# Patient Record
Sex: FEMALE | Race: WHITE | NOT HISPANIC OR LATINO | Employment: OTHER | URBAN - METROPOLITAN AREA
[De-identification: names, ages, dates, MRNs, and addresses within clinical notes are randomized per-mention and may not be internally consistent; named-entity substitution may affect disease eponyms.]

---

## 2018-09-04 ENCOUNTER — APPOINTMENT (OUTPATIENT)
Dept: RADIOLOGY | Facility: MEDICAL CENTER | Age: 68
DRG: 054 | End: 2018-09-04
Attending: EMERGENCY MEDICINE
Payer: MEDICARE

## 2018-09-04 ENCOUNTER — HOSPITAL ENCOUNTER (OUTPATIENT)
Dept: RADIOLOGY | Facility: MEDICAL CENTER | Age: 68
End: 2018-09-04

## 2018-09-04 ENCOUNTER — APPOINTMENT (OUTPATIENT)
Dept: RADIOLOGY | Facility: MEDICAL CENTER | Age: 68
DRG: 054 | End: 2018-09-04
Attending: HOSPITALIST
Payer: MEDICARE

## 2018-09-04 ENCOUNTER — HOSPITAL ENCOUNTER (INPATIENT)
Facility: MEDICAL CENTER | Age: 68
LOS: 2 days | DRG: 054 | End: 2018-09-06
Attending: EMERGENCY MEDICINE | Admitting: HOSPITALIST
Payer: MEDICARE

## 2018-09-04 DIAGNOSIS — I62.9 ICB (INTRACRANIAL BLEED) (HCC): ICD-10-CM

## 2018-09-04 DIAGNOSIS — C79.31 METASTASIS TO BRAIN (HCC): ICD-10-CM

## 2018-09-04 DIAGNOSIS — R41.82 ALTERED MENTAL STATUS, UNSPECIFIED ALTERED MENTAL STATUS TYPE: ICD-10-CM

## 2018-09-04 DIAGNOSIS — I61.9 NONTRAUMATIC INTRACEREBRAL HEMORRHAGE, UNSPECIFIED CEREBRAL LOCATION, UNSPECIFIED LATERALITY (HCC): ICD-10-CM

## 2018-09-04 PROBLEM — G93.40 ENCEPHALOPATHY: Status: ACTIVE | Noted: 2018-09-04

## 2018-09-04 PROBLEM — C43.9 MELANOMA (HCC): Status: ACTIVE | Noted: 2018-09-04

## 2018-09-04 PROBLEM — D23.9 DYSPLASTIC NEVUS: Status: ACTIVE | Noted: 2018-09-04

## 2018-09-04 PROBLEM — R91.8 LUNG MASS: Status: ACTIVE | Noted: 2018-09-04

## 2018-09-04 PROBLEM — J18.9 PNEUMONIA: Status: ACTIVE | Noted: 2018-09-04

## 2018-09-04 PROBLEM — D72.829 LEUKOCYTOSIS: Status: ACTIVE | Noted: 2018-09-04

## 2018-09-04 LAB
ALBUMIN SERPL BCP-MCNC: 4.4 G/DL (ref 3.2–4.9)
ALBUMIN/GLOB SERPL: 1.4 G/DL
ALP SERPL-CCNC: 91 U/L (ref 30–99)
ALT SERPL-CCNC: 17 U/L (ref 2–50)
ANION GAP SERPL CALC-SCNC: 11 MMOL/L (ref 0–11.9)
APTT PPP: 28 SEC (ref 24.7–36)
AST SERPL-CCNC: 21 U/L (ref 12–45)
BASOPHILS # BLD AUTO: 0.2 % (ref 0–1.8)
BASOPHILS # BLD: 0.04 K/UL (ref 0–0.12)
BILIRUB SERPL-MCNC: 0.5 MG/DL (ref 0.1–1.5)
BNP SERPL-MCNC: 140 PG/ML (ref 0–100)
BUN SERPL-MCNC: 17 MG/DL (ref 8–22)
CALCIUM SERPL-MCNC: 9.5 MG/DL (ref 8.5–10.5)
CFT BLD TEG: 3.8 MIN (ref 5–10)
CHLORIDE SERPL-SCNC: 106 MMOL/L (ref 96–112)
CLOT ANGLE BLD TEG: 73.6 DEGREES (ref 53–72)
CLOT LYSIS 30M P MA LENFR BLD TEG: 0 % (ref 0–8)
CO2 SERPL-SCNC: 21 MMOL/L (ref 20–33)
CREAT SERPL-MCNC: 0.64 MG/DL (ref 0.5–1.4)
CT.EXTRINSIC BLD ROTEM: 1 MIN (ref 1–3)
EKG IMPRESSION: NORMAL
EOSINOPHIL # BLD AUTO: 0.01 K/UL (ref 0–0.51)
EOSINOPHIL NFR BLD: 0.1 % (ref 0–6.9)
ERYTHROCYTE [DISTWIDTH] IN BLOOD BY AUTOMATED COUNT: 42.4 FL (ref 35.9–50)
GLOBULIN SER CALC-MCNC: 3.2 G/DL (ref 1.9–3.5)
GLUCOSE SERPL-MCNC: 166 MG/DL (ref 65–99)
HCT VFR BLD AUTO: 38.1 % (ref 37–47)
HGB BLD-MCNC: 12.6 G/DL (ref 12–16)
IMM GRANULOCYTES # BLD AUTO: 0.07 K/UL (ref 0–0.11)
IMM GRANULOCYTES NFR BLD AUTO: 0.4 % (ref 0–0.9)
INR PPP: 1.02 (ref 0.87–1.13)
LIPASE SERPL-CCNC: <3 U/L (ref 11–82)
LYMPHOCYTES # BLD AUTO: 0.53 K/UL (ref 1–4.8)
LYMPHOCYTES NFR BLD: 3.2 % (ref 22–41)
MCF BLD TEG: 71.2 MM (ref 50–70)
MCH RBC QN AUTO: 30.4 PG (ref 27–33)
MCHC RBC AUTO-ENTMCNC: 33.1 G/DL (ref 33.6–35)
MCV RBC AUTO: 92 FL (ref 81.4–97.8)
MONOCYTES # BLD AUTO: 0.51 K/UL (ref 0–0.85)
MONOCYTES NFR BLD AUTO: 3.1 % (ref 0–13.4)
NEUTROPHILS # BLD AUTO: 15.55 K/UL (ref 2–7.15)
NEUTROPHILS NFR BLD: 93 % (ref 44–72)
NRBC # BLD AUTO: 0 K/UL
NRBC BLD-RTO: 0 /100 WBC
PA AA BLD-ACNC: 17.5 %
PA ADP BLD-ACNC: 3.9 %
PLATELET # BLD AUTO: 308 K/UL (ref 164–446)
PMV BLD AUTO: 11.4 FL (ref 9–12.9)
POTASSIUM SERPL-SCNC: 3.6 MMOL/L (ref 3.6–5.5)
PROT SERPL-MCNC: 7.6 G/DL (ref 6–8.2)
PROTHROMBIN TIME: 13.1 SEC (ref 12–14.6)
RBC # BLD AUTO: 4.14 M/UL (ref 4.2–5.4)
SODIUM SERPL-SCNC: 138 MMOL/L (ref 135–145)
TEG ALGORITHM TGALG: ABNORMAL
TROPONIN I SERPL-MCNC: <0.01 NG/ML (ref 0–0.04)
WBC # BLD AUTO: 16.7 K/UL (ref 4.8–10.8)

## 2018-09-04 PROCEDURE — 700105 HCHG RX REV CODE 258: Performed by: INTERNAL MEDICINE

## 2018-09-04 PROCEDURE — 84484 ASSAY OF TROPONIN QUANT: CPT

## 2018-09-04 PROCEDURE — 83880 ASSAY OF NATRIURETIC PEPTIDE: CPT

## 2018-09-04 PROCEDURE — 85576 BLOOD PLATELET AGGREGATION: CPT

## 2018-09-04 PROCEDURE — 36415 COLL VENOUS BLD VENIPUNCTURE: CPT

## 2018-09-04 PROCEDURE — 85347 COAGULATION TIME ACTIVATED: CPT

## 2018-09-04 PROCEDURE — 99223 1ST HOSP IP/OBS HIGH 75: CPT | Mod: AI | Performed by: HOSPITALIST

## 2018-09-04 PROCEDURE — 93005 ELECTROCARDIOGRAM TRACING: CPT | Performed by: INTERNAL MEDICINE

## 2018-09-04 PROCEDURE — 93005 ELECTROCARDIOGRAM TRACING: CPT | Performed by: EMERGENCY MEDICINE

## 2018-09-04 PROCEDURE — 700105 HCHG RX REV CODE 258: Performed by: HOSPITALIST

## 2018-09-04 PROCEDURE — 99291 CRITICAL CARE FIRST HOUR: CPT

## 2018-09-04 PROCEDURE — 93010 ELECTROCARDIOGRAM REPORT: CPT | Performed by: INTERNAL MEDICINE

## 2018-09-04 PROCEDURE — 70553 MRI BRAIN STEM W/O & W/DYE: CPT

## 2018-09-04 PROCEDURE — 80053 COMPREHEN METABOLIC PANEL: CPT

## 2018-09-04 PROCEDURE — 770022 HCHG ROOM/CARE - ICU (200)

## 2018-09-04 PROCEDURE — 93005 ELECTROCARDIOGRAM TRACING: CPT

## 2018-09-04 PROCEDURE — 700105 HCHG RX REV CODE 258: Performed by: NEUROLOGICAL SURGERY

## 2018-09-04 PROCEDURE — 83690 ASSAY OF LIPASE: CPT

## 2018-09-04 PROCEDURE — 700111 HCHG RX REV CODE 636 W/ 250 OVERRIDE (IP): Performed by: HOSPITALIST

## 2018-09-04 PROCEDURE — 99221 1ST HOSP IP/OBS SF/LOW 40: CPT | Performed by: INTERNAL MEDICINE

## 2018-09-04 PROCEDURE — 700111 HCHG RX REV CODE 636 W/ 250 OVERRIDE (IP): Performed by: INTERNAL MEDICINE

## 2018-09-04 PROCEDURE — 85384 FIBRINOGEN ACTIVITY: CPT

## 2018-09-04 PROCEDURE — 700111 HCHG RX REV CODE 636 W/ 250 OVERRIDE (IP): Performed by: EMERGENCY MEDICINE

## 2018-09-04 PROCEDURE — 85025 COMPLETE CBC W/AUTO DIFF WBC: CPT

## 2018-09-04 PROCEDURE — 85730 THROMBOPLASTIN TIME PARTIAL: CPT

## 2018-09-04 PROCEDURE — 96374 THER/PROPH/DIAG INJ IV PUSH: CPT

## 2018-09-04 PROCEDURE — 700111 HCHG RX REV CODE 636 W/ 250 OVERRIDE (IP): Performed by: NEUROLOGICAL SURGERY

## 2018-09-04 PROCEDURE — 85610 PROTHROMBIN TIME: CPT

## 2018-09-04 RX ORDER — ONDANSETRON 2 MG/ML
4 INJECTION INTRAMUSCULAR; INTRAVENOUS EVERY 4 HOURS PRN
Status: DISCONTINUED | OUTPATIENT
Start: 2018-09-04 | End: 2018-09-05

## 2018-09-04 RX ORDER — ONDANSETRON 4 MG/1
4 TABLET, ORALLY DISINTEGRATING ORAL EVERY 4 HOURS PRN
Status: DISCONTINUED | OUTPATIENT
Start: 2018-09-04 | End: 2018-09-05

## 2018-09-04 RX ORDER — LABETALOL HYDROCHLORIDE 5 MG/ML
10 INJECTION, SOLUTION INTRAVENOUS
Status: DISCONTINUED | OUTPATIENT
Start: 2018-09-04 | End: 2018-09-05

## 2018-09-04 RX ORDER — BISACODYL 10 MG
10 SUPPOSITORY, RECTAL RECTAL
Status: DISCONTINUED | OUTPATIENT
Start: 2018-09-04 | End: 2018-09-06 | Stop reason: HOSPADM

## 2018-09-04 RX ORDER — SODIUM CHLORIDE, SODIUM LACTATE, POTASSIUM CHLORIDE, CALCIUM CHLORIDE 600; 310; 30; 20 MG/100ML; MG/100ML; MG/100ML; MG/100ML
INJECTION, SOLUTION INTRAVENOUS CONTINUOUS
Status: DISCONTINUED | OUTPATIENT
Start: 2018-09-04 | End: 2018-09-04

## 2018-09-04 RX ORDER — POLYETHYLENE GLYCOL 3350 17 G/17G
1 POWDER, FOR SOLUTION ORAL
Status: DISCONTINUED | OUTPATIENT
Start: 2018-09-04 | End: 2018-09-06 | Stop reason: HOSPADM

## 2018-09-04 RX ORDER — HYDRALAZINE HYDROCHLORIDE 20 MG/ML
10 INJECTION INTRAMUSCULAR; INTRAVENOUS
Status: DISCONTINUED | OUTPATIENT
Start: 2018-09-04 | End: 2018-09-05

## 2018-09-04 RX ORDER — ACETAMINOPHEN 325 MG/1
650 TABLET ORAL EVERY 6 HOURS PRN
Status: DISCONTINUED | OUTPATIENT
Start: 2018-09-04 | End: 2018-09-06 | Stop reason: HOSPADM

## 2018-09-04 RX ORDER — SODIUM CHLORIDE 9 MG/ML
INJECTION, SOLUTION INTRAVENOUS CONTINUOUS
Status: DISCONTINUED | OUTPATIENT
Start: 2018-09-04 | End: 2018-09-05

## 2018-09-04 RX ORDER — AMOXICILLIN 250 MG
2 CAPSULE ORAL 2 TIMES DAILY
Status: DISCONTINUED | OUTPATIENT
Start: 2018-09-04 | End: 2018-09-06 | Stop reason: HOSPADM

## 2018-09-04 RX ORDER — DEXAMETHASONE SODIUM PHOSPHATE 4 MG/ML
10 INJECTION, SOLUTION INTRA-ARTICULAR; INTRALESIONAL; INTRAMUSCULAR; INTRAVENOUS; SOFT TISSUE ONCE
Status: COMPLETED | OUTPATIENT
Start: 2018-09-04 | End: 2018-09-04

## 2018-09-04 RX ADMIN — DEXAMETHASONE SODIUM PHOSPHATE 10 MG: 4 INJECTION, SOLUTION INTRAMUSCULAR; INTRAVENOUS at 14:33

## 2018-09-04 RX ADMIN — FENTANYL CITRATE 25 MCG: 50 INJECTION INTRAMUSCULAR; INTRAVENOUS at 18:47

## 2018-09-04 RX ADMIN — FENTANYL CITRATE 25 MCG: 50 INJECTION INTRAMUSCULAR; INTRAVENOUS at 20:49

## 2018-09-04 RX ADMIN — SODIUM CHLORIDE: 9 INJECTION, SOLUTION INTRAVENOUS at 18:15

## 2018-09-04 RX ADMIN — SODIUM CHLORIDE, POTASSIUM CHLORIDE, SODIUM LACTATE AND CALCIUM CHLORIDE: 600; 310; 30; 20 INJECTION, SOLUTION INTRAVENOUS at 18:01

## 2018-09-04 RX ADMIN — ONDANSETRON 4 MG: 2 INJECTION INTRAMUSCULAR; INTRAVENOUS at 21:00

## 2018-09-04 RX ADMIN — SODIUM CHLORIDE 500 MG: 9 INJECTION, SOLUTION INTRAVENOUS at 18:01

## 2018-09-04 RX ADMIN — FENTANYL CITRATE 25 MCG: 50 INJECTION INTRAMUSCULAR; INTRAVENOUS at 23:27

## 2018-09-04 ASSESSMENT — PAIN SCALES - GENERAL
PAINLEVEL_OUTOF10: 8
PAINLEVEL_OUTOF10: 10
PAINLEVEL_OUTOF10: 4
PAINLEVEL_OUTOF10: 10
PAINLEVEL_OUTOF10: 10

## 2018-09-04 ASSESSMENT — ENCOUNTER SYMPTOMS: HEADACHES: 1

## 2018-09-04 ASSESSMENT — PAIN SCALES - WONG BAKER: WONGBAKER_NUMERICALRESPONSE: HURTS A WHOLE LOT

## 2018-09-04 NOTE — CONSULTS
DATE OF SERVICE:  09/04/2018    NEUROSURGICAL CONSULTATION    HISTORY OF PRESENT ILLNESS:  The patient is a 68-year-old woman who was   brought to the hospital today having been found at 9:00 this morning, found   down by her friend.  She was brought to [facility] and CAT scan was   performed showing an intracranial hemorrhage and patient was transferred to   [facility] for definitive care.    PAST MEDICAL HISTORY:  By report only has a history of skin cancer, remainder   unknown.    PAST SURGICAL HISTORY:  Unknown.    SOCIAL HISTORY:  She is a previous smoker as well.    FAMILY HISTORY:  Noncontributory.    MEDICATIONS:  Unknown.    ALLERGIES:  CODEINE.    PHYSICAL EXAMINATION:  GENERAL:  Awake, opens eyes to voice.  She has a mixed dysphasia.  HEENT:  Pupils are equal and grossly conjugate gaze.  NEUROLOGIC:  Left upper extremity and left lower extremity are 5/5.  Right   upper extremity and right lower extremity are 4/5.    LABORATORY VALUES:  CBC significant for white count of 16.7, hemoglobin 12.6.    Remainder within normal limits.  Basic metabolic panel within normal limits   except for glucose of 166.  Troponin normal.  BNP is 140.  Coags are normal.    IMAGING:  MRI of the brain with and without contrast done today at [facility]   shows a large left-sided intracerebral hemorrhage with a small amount of left   midline shift.  She also has what I counted to be approximately 20 metastatic   lesions.  There appears to be a metastatic lesion medial to the large   hemorrhage that she has in the left parietooccipital region.    PLAN:  Systolic less than 160, Keppra 500 b.i.d.  We will watch the patient   closely given the significant metastatic disease, I have talked with the   family friend that I would not recommend being aggressive at this point unless   family wanted to be that even if we are to take the hemorrhage out, her   overall prognosis would be very poor given the extensive metastatic disease.    We will  wait to talk to the granddaughter shortly.  She should be here in   about a half hour.       ____________________________________     SANTOS YOON MD    CPD / NTS    DD:  09/04/2018 15:57:18  DT:  09/04/2018 16:24:46    D#:  [id number]  Job#:  [id number]

## 2018-09-04 NOTE — ED TRIAGE NOTES
Transfer from [facility]. Pt was found down altered in home by friend at 0900. History of skin CA, possible mets to brain and lungs today

## 2018-09-04 NOTE — ED NOTES
Contrast dye given at this time. Pt advised to not move during scan. Oxygen saturation holding during scan, no change in heart rate

## 2018-09-04 NOTE — ED NOTES
"Pt roomed and connected to monitor, blood drawn and sent to lab. Pt is grabbing her head, she in unable to articulate how much it hurts, just says that it \"hurts a lot.\" Chart up for ERP  "

## 2018-09-04 NOTE — CONSULTS
Critical Care H&P      Date of Service: 9/4/2018    Referring provider: Dr Prince    Chief Complaint: AMS    History of Present Illness: 68 y F with significant PMH of MS treated with THC and melanoma that was fine last night but family grandchildren didn't hear from her and so called today and she explained she was just sick, could express denied nausea or vomiting, or diarrhea, fever but was just sick. Family went over to find her AMS so was taken to local ER from to have ICH from metastic lesion and transferred to [facility] for higher level of care. Patient mallika aspirin of blood thinners, but does take ibuprofen regular doesn't take vicodin she is prescribed. Mallika hypertension, cva, dm or cardiac disease no tobacco or drug use other then THC.       Past Medical History:  MS  Melanoma    Past Surgical History:  None    Allergies:  Codeine    Medications:  Vicodin doesn't take  THC     Social History:  Lives alone  TCH use  Mallika tobacco, etoh, drug use other then above.        Family History:  family history is not on file.    Physical Examination:  GEN: no acute distress sitting with tongue swab in  HEENT: EOMI, Pupils 4-2, tongue midline, no facial droop  RESP: clear bilateral no respiratory distress  HEART rrr no murmurs  ABD soft nontender no rebound or guarding  EXT: lesion from recent removal on right knee, no edema  NEURO: GCS 14/15 some slurred speech, expressive and receptive aphasia, right arm  And leg weakness, normal sensation, right side neglect?  PSYCH: unable to assess.   SKIN: skin lesion from recent removal right knee, no bruising or petechia      Laboratories:  MRI/MRA:  9/4/2018  1.  Hemorrhagic brain metastasis in the left posterior temporal and occipital lobe. The hemorrhagic component measures about 75 mm in maximum dimension. An oblong solid enhancing portion of the medial aspect of the hemorrhage is noted. Associated   left-to-right shift of midline structures.  2.  Intraventricular  hemorrhage with mild hydrocephalus.  3.  Numerous additional supratentorial hemorrhagic and nonhemorrhagic brain metastases numbering at least 14 lesions. The largest of these seen at the right parietal vertex measures 19 mm. Additional nodules are subcentimeter in size.  4.  4 mm brainstem metastasis in the right paramedian markell and 3 nodular cerebellar metastatic deposits are noted.    EKG SNR 64    WBC 16, hg 12 /hct 38 plt 308    Chemistry unremarkable    Trop neg       PT 13.1  INR 1  PTT 28        Impression/Plan:  67 yo F with hemorrhagic mass left sided with intraventricular extnesion and left to right shift also showing 20 metastatic lesions.     ICH/IVH metastic lesions:   - SBP < 160  - Keppra 500 BID  - head of bed > 30  - aspiration precautions  - palliative care consult  - ? Steroids responsive discussed with NSG not indicated at this time.   - no heparin of antiplts until stability of of hemorrhage.     MS:   - unclear history  - Use THC for control    THC use  - consule avoidance    History of melanoma:  - recent surgery to knee  - lesions on brain suspicious for melanoma    Palliative care recommended  - Patient has poor prognosis  - Family granddaughter is closest to her and knows her best would not want aggressive care  - Spent 40 minutes today discussing with Sister over phone, family members at bedside and granddaughter.       Patient remains in critical condition from hemorrhagic ICH/IVH and metastic brain lesions and requires BP control to prevent further bleeding with goal SBP < 160. Critical care time provided was 60 minutes. This excludes all separate billable procedures.       Spenser Smith MD  Critical Care Medicine    Spoke with Leslie Sister on phone and ali granddaughter who knows her best. Sister Leslie in [place] feels that she wouldn't want to be maintained on ventilator and agrees with DNR/DNI  Still discussing with Ali granddaughter which she thinks.

## 2018-09-04 NOTE — ED PROVIDER NOTES
"ED Provider Note    Scribed for Chuck Simmons M.D. by Laurita Arreaga. 9/4/2018, 2:13 PM.    Means of arrival: ambulance  History obtained from: patient  History limited by: patient's altered mental status    CHIEF COMPLAINT  Chief Complaint   Patient presents with   • ALOC     found at 0900 this morning, normal last night       HPI  Yoana Espinosa is a 68 y.o. female who presents to the Emergency Department as a transfer from [facility] for evaluation of altered mental status. Patient was found down and altered around 9AM this morning in her home by a friend. Last seen normal was yesterday evening. Patient has a history of skin cancer, and imaging completed at transferring facility suggests possible metastasis to brain and lungs as well as a intraparenchymal hemorrhage. Patient is currently complaining of a headache.    Further history limited secondary to patient's altered mental status.    REVIEW OF SYSTEMS  Review of Systems   Neurological: Positive for headaches.        Positive AMS      ROS limited secondary to patient's altered mental status    PAST MEDICAL HISTORY   skin cancer    SURGICAL HISTORY  patient denies any surgical history    SOCIAL HISTORY  None noted    FAMILY HISTORY  None noted    CURRENT MEDICATIONS  No current facility-administered medications on file prior to encounter.      No current outpatient prescriptions on file prior to encounter.       ALLERGIES  Allergies   Allergen Reactions   • Codeine        PHYSICAL EXAM  VITAL SIGNS: /80   Pulse 78   Temp 37.1 °C (98.8 °F)   Resp 20   Ht 1.676 m (5' 6\")   Wt 50 kg (110 lb 3.7 oz)   BMI 17.79 kg/m²     Constitutional: Well developed, Well nourished, No acute distress, Non-toxic appearance.   HENT: Normocephalic, Atraumatic, Bilateral external ears normal, oropharynx dry, No oral exudates, Nose normal.   Eyes:conjunctiva is normal, there are no signs of exudate.   Neck: Supple,  Cardiovascular: Regular rate and rhythm without " murmurs gallops or rubs.   Thorax & Lungs: No respiratory distress. Breathing comfortably. Lungs are clear to auscultation bilaterally, there are no wheezes no rales. Chest wall is nontender.  Abdomen: Soft, nontender, nondistended..   Skin: Warm, Dry, No erythema,   Musculoskeletal:  No tenderness to palpation or major deformities noted. Intact distal pulses, no clubbing, no cyanosis, no edema,   Neurologic: right arm drift and weakness, 3/5 strength RUE, 4/5 strength RLE, cranial nerves II-XII grossly intact, positive Babinki's RLE, alter to person not time, place, or events  Psychiatric: Affect normal, Judgment normal, Mood normal.     LABS  Results for orders placed or performed during the hospital encounter of 09/04/18   CBC with Differential   Result Value Ref Range    WBC 16.7 (H) 4.8 - 10.8 K/uL    RBC 4.14 (L) 4.20 - 5.40 M/uL    Hemoglobin 12.6 12.0 - 16.0 g/dL    Hematocrit 38.1 37.0 - 47.0 %    MCV 92.0 81.4 - 97.8 fL    MCH 30.4 27.0 - 33.0 pg    MCHC 33.1 (L) 33.6 - 35.0 g/dL    RDW 42.4 35.9 - 50.0 fL    Platelet Count 308 164 - 446 K/uL    MPV 11.4 9.0 - 12.9 fL    Neutrophils-Polys 93.00 (H) 44.00 - 72.00 %    Lymphocytes 3.20 (L) 22.00 - 41.00 %    Monocytes 3.10 0.00 - 13.40 %    Eosinophils 0.10 0.00 - 6.90 %    Basophils 0.20 0.00 - 1.80 %    Immature Granulocytes 0.40 0.00 - 0.90 %    Nucleated RBC 0.00 /100 WBC    Neutrophils (Absolute) 15.55 (H) 2.00 - 7.15 K/uL    Lymphs (Absolute) 0.53 (L) 1.00 - 4.80 K/uL    Monos (Absolute) 0.51 0.00 - 0.85 K/uL    Eos (Absolute) 0.01 0.00 - 0.51 K/uL    Baso (Absolute) 0.04 0.00 - 0.12 K/uL    Immature Granulocytes (abs) 0.07 0.00 - 0.11 K/uL    NRBC (Absolute) 0.00 K/uL   Complete Metabolic Panel (CMP)   Result Value Ref Range    Sodium 138 135 - 145 mmol/L    Potassium 3.6 3.6 - 5.5 mmol/L    Chloride 106 96 - 112 mmol/L    Co2 21 20 - 33 mmol/L    Anion Gap 11.0 0.0 - 11.9    Glucose 166 (H) 65 - 99 mg/dL    Bun 17 8 - 22 mg/dL    Creatinine 0.64 0.50  - 1.40 mg/dL    Calcium 9.5 8.5 - 10.5 mg/dL    AST(SGOT) 21 12 - 45 U/L    ALT(SGPT) 17 2 - 50 U/L    Alkaline Phosphatase 91 30 - 99 U/L    Total Bilirubin 0.5 0.1 - 1.5 mg/dL    Albumin 4.4 3.2 - 4.9 g/dL    Total Protein 7.6 6.0 - 8.2 g/dL    Globulin 3.2 1.9 - 3.5 g/dL    A-G Ratio 1.4 g/dL   Lipase   Result Value Ref Range    Lipase <3 (L) 11 - 82 U/L   ESTIMATED GFR   Result Value Ref Range    GFR If African American >60 >60 mL/min/1.73 m 2    GFR If Non African American >60 >60 mL/min/1.73 m 2   EKG (NOW)   Result Value Ref Range    Report       [facility] Emergency Dept.    Test Date:  2018  Pt Name:    EMILY BROTHERS                Department: ER  MRN:        [id number]                      Room:        03  Gender:     Female                       Technician: [id number]  :        [id number]                   Requested By:ER TRIAGE PROTOCOL  Order #:    [id number]                    Reading MD: ADRIAN TYLER MD    Measurements  Intervals                                Axis  Rate:       64                           P:          12  NE:         152                          QRS:        14  QRSD:       90                           T:          -73  QT:         420  QTc:        434    Interpretive Statements  SINUS RHYTHM  BORDERLINE LOW VOLTAGE IN FRONTAL LEADS  BORDERLINE REPOLARIZATION ABNORMALITY  No previous ECG available for comparison    Electronically Signed On 2018 14:12:40 PDT by ADRIAN TYLER MD         All labs reviewed by me.    EKG  Interpreted by me as above    RADIOLOGY  OUTSIDE IMAGES-CT HEAD   Final Result      OUTSIDE IMAGES-DX CHEST   Final Result      DX-CHEST-PORTABLE (1 VIEW)    (Results Pending)   MR-BRAIN-WITH & W/O    (Results Pending)   NC-CAUPJKS-6 VIEW    (Results Pending)     The radiologist's interpretation of all radiological studies have been reviewed by me.    COURSE & MEDICAL DECISION MAKING  Pertinent Labs & Imaging studies reviewed. (See chart  for details)    Review of transferring facility imaging.  Acute intraparenchymal hemorrhage with approximate volume of 101ml causing effacement of occipital horn left lateral ventricle and 7mm left to right midline shift. 2. Intraparenchymal hemorrhage seen in the frontal horn left lateral ventricle and 4th ventricle 3. Second region of white matter edema right high posterior parietal lobe seen, this second region of white matter edema and appearing to be associated with 15mm mass lesion adjacent to falx    2:13 PM - Patient seen and examined at bedside. Patient will be treated with 10 mg IV Decadron. Ordered abdomen xray, brain MRI, chest xray, platelet mapping, CBC, CMP, BNP, PT.INR, APTT, lipase, troponin, EKG to evaluate her symptoms.    2:17 PM Spoke with Dr. Bentley, Neurosurgery, about the patient's condition. Advises MRI, starting patient on steroids and will be taken fr OR intervention.     2:25 PM Spoke with Dr. Caldwell, Hospitalist, about the patient's condition. Agrees to admit the patient.    CRITICAL CARE  The very real possibility of a deterioration of this patient's condition required the highest level of my preparedness for sudden, emergent intervention.  I provided critical care services, which included medication orders, frequent reevaluations of the patient's condition and response to treatment, ordering and reviewing test results, and discussing the case with various consultants.  The critical care time associated with the care of the patient was 35 minutes. Review chart for interventions. This time is exclusive of any other billable procedures.     Decision Making:  Patient presents does have a very large occipital hemorrhage and most likely metastatic disease.  Start the patient on Decadron I spoke with Dr. Bentley will take the patient to the operating room but he wants to have an MRI done prior.  I spoke the hospitalist who will admit.    DISPOSITION:  Patient will be admitted to Dr. Caldwell,  Hospitalist in critical condition.     FINAL IMPRESSION  1. ICB (intracranial bleed) ([facility])    2. Altered mental status, unspecified altered mental status type    35 minutes of critical care time      ILaurita (Scribe), am scribing for, and in the presence of, Chuck Simmons M.D..    Electronically signed by: Laurita Arreaga (Scribe), 9/4/2018    IChuck M.D. personally performed the services described in this documentation, as scribed by Laurita Arreaga in my presence, and it is both accurate and complete.    The note accurately reflects work and decisions made by me.  Chuck Simmons  9/4/2018  7:15 PM

## 2018-09-04 NOTE — ED NOTES
KINDER FALL RISK       RISK  Present to ED b/c of fall (syncope, seizure, or ALOC) Yes  Age  >70 No  Altered Mental Status (Intoxicated with alcohol or substance confusion, inability to follow instructions, disorientation) Yes  Impaired Mobility (Ambulates or transfers with assistive devices or assist. Ambulates with unsteady gait and no assistance.  Unable to ambulate to transfer.) Yes  Nursing Judgement (Bowel or bladder incontinence, diarrhea, urinary frequency or urgency, leg weakness, orthostatic hypotension, dizziness or vertigo, narcotic use.) Yes    Interventions in place in red.   YES TO ANY RISK = HIGH FALL RISK     1. Move patient closer to nurses stations  2. Familiarize the patient with environment  3. Place call light within reach and demonstrate call light use  4. Keep patients personal possessions within patient safe reach (if appropriate)   5. Place stretcher in low position and brakes locked  6.Place yellow socks and armband on patient   7. Place green triangle on patients door  8. Give patient urinal if applicable  9. Keep floor surfaces clean and dry  10.Keep patient care areas uncluttered  11. Use a lap belt or posey vest   12. Assess patient hourly for :Pain, persona needs, position change, and call light access.       High fall risk, all appropriate interventions in place

## 2018-09-05 ENCOUNTER — APPOINTMENT (OUTPATIENT)
Dept: RADIOLOGY | Facility: MEDICAL CENTER | Age: 68
DRG: 054 | End: 2018-09-05
Attending: HOSPITALIST
Payer: MEDICARE

## 2018-09-05 LAB
ANION GAP SERPL CALC-SCNC: 12 MMOL/L (ref 0–11.9)
BASOPHILS # BLD AUTO: 0.2 % (ref 0–1.8)
BASOPHILS # BLD: 0.03 K/UL (ref 0–0.12)
BLOOD CULTURE HOLD CXBCH: NORMAL
BUN SERPL-MCNC: 19 MG/DL (ref 8–22)
CALCIUM SERPL-MCNC: 9.5 MG/DL (ref 8.5–10.5)
CHLORIDE SERPL-SCNC: 111 MMOL/L (ref 96–112)
CO2 SERPL-SCNC: 17 MMOL/L (ref 20–33)
CREAT SERPL-MCNC: 0.63 MG/DL (ref 0.5–1.4)
EOSINOPHIL # BLD AUTO: 0 K/UL (ref 0–0.51)
EOSINOPHIL NFR BLD: 0 % (ref 0–6.9)
ERYTHROCYTE [DISTWIDTH] IN BLOOD BY AUTOMATED COUNT: 44.9 FL (ref 35.9–50)
GLUCOSE SERPL-MCNC: 165 MG/DL (ref 65–99)
HCT VFR BLD AUTO: 32.4 % (ref 37–47)
HGB BLD-MCNC: 10.4 G/DL (ref 12–16)
IMM GRANULOCYTES # BLD AUTO: 0.11 K/UL (ref 0–0.11)
IMM GRANULOCYTES NFR BLD AUTO: 0.6 % (ref 0–0.9)
LYMPHOCYTES # BLD AUTO: 0.73 K/UL (ref 1–4.8)
LYMPHOCYTES NFR BLD: 4.1 % (ref 22–41)
MCH RBC QN AUTO: 30.3 PG (ref 27–33)
MCHC RBC AUTO-ENTMCNC: 32.1 G/DL (ref 33.6–35)
MCV RBC AUTO: 94.5 FL (ref 81.4–97.8)
MONOCYTES # BLD AUTO: 0.58 K/UL (ref 0–0.85)
MONOCYTES NFR BLD AUTO: 3.3 % (ref 0–13.4)
NEUTROPHILS # BLD AUTO: 16.22 K/UL (ref 2–7.15)
NEUTROPHILS NFR BLD: 91.8 % (ref 44–72)
NRBC # BLD AUTO: 0 K/UL
NRBC BLD-RTO: 0 /100 WBC
PLATELET # BLD AUTO: 238 K/UL (ref 164–446)
PMV BLD AUTO: 11.2 FL (ref 9–12.9)
POTASSIUM SERPL-SCNC: 3.3 MMOL/L (ref 3.6–5.5)
RBC # BLD AUTO: 3.43 M/UL (ref 4.2–5.4)
SODIUM SERPL-SCNC: 140 MMOL/L (ref 135–145)
WBC # BLD AUTO: 17.7 K/UL (ref 4.8–10.8)

## 2018-09-05 PROCEDURE — 700105 HCHG RX REV CODE 258: Performed by: NEUROLOGICAL SURGERY

## 2018-09-05 PROCEDURE — 700105 HCHG RX REV CODE 258: Performed by: INTERNAL MEDICINE

## 2018-09-05 PROCEDURE — 700111 HCHG RX REV CODE 636 W/ 250 OVERRIDE (IP): Performed by: HOSPITALIST

## 2018-09-05 PROCEDURE — 99233 SBSQ HOSP IP/OBS HIGH 50: CPT | Performed by: HOSPITALIST

## 2018-09-05 PROCEDURE — 700105 HCHG RX REV CODE 258: Performed by: HOSPITALIST

## 2018-09-05 PROCEDURE — 87040 BLOOD CULTURE FOR BACTERIA: CPT

## 2018-09-05 PROCEDURE — 700111 HCHG RX REV CODE 636 W/ 250 OVERRIDE (IP): Performed by: NEUROLOGICAL SURGERY

## 2018-09-05 PROCEDURE — 700111 HCHG RX REV CODE 636 W/ 250 OVERRIDE (IP): Performed by: INTERNAL MEDICINE

## 2018-09-05 PROCEDURE — 99291 CRITICAL CARE FIRST HOUR: CPT | Performed by: INTERNAL MEDICINE

## 2018-09-05 PROCEDURE — 700101 HCHG RX REV CODE 250: Performed by: INTERNAL MEDICINE

## 2018-09-05 PROCEDURE — 80048 BASIC METABOLIC PNL TOTAL CA: CPT

## 2018-09-05 PROCEDURE — 700101 HCHG RX REV CODE 250: Performed by: HOSPITALIST

## 2018-09-05 PROCEDURE — 51798 US URINE CAPACITY MEASURE: CPT

## 2018-09-05 PROCEDURE — 770001 HCHG ROOM/CARE - MED/SURG/GYN PRIV*

## 2018-09-05 PROCEDURE — 85025 COMPLETE CBC W/AUTO DIFF WBC: CPT

## 2018-09-05 RX ORDER — ONDANSETRON 4 MG/1
8 TABLET, ORALLY DISINTEGRATING ORAL EVERY 8 HOURS PRN
Status: DISCONTINUED | OUTPATIENT
Start: 2018-09-05 | End: 2018-09-06 | Stop reason: HOSPADM

## 2018-09-05 RX ORDER — LORAZEPAM 2 MG/ML
1 CONCENTRATE ORAL
Status: DISCONTINUED | OUTPATIENT
Start: 2018-09-05 | End: 2018-09-06 | Stop reason: HOSPADM

## 2018-09-05 RX ORDER — MORPHINE SULFATE 100 MG/5ML
10 SOLUTION ORAL
Status: DISCONTINUED | OUTPATIENT
Start: 2018-09-05 | End: 2018-09-06

## 2018-09-05 RX ORDER — DOCUSATE SODIUM 100 MG/1
100 CAPSULE, LIQUID FILLED ORAL EVERY 12 HOURS
Status: DISCONTINUED | OUTPATIENT
Start: 2018-09-05 | End: 2018-09-06 | Stop reason: HOSPADM

## 2018-09-05 RX ORDER — LORAZEPAM 2 MG/ML
1-5 INJECTION INTRAMUSCULAR
Status: DISCONTINUED | OUTPATIENT
Start: 2018-09-05 | End: 2018-09-06 | Stop reason: HOSPADM

## 2018-09-05 RX ORDER — PAPAVERINE HYDROCHLORIDE 30 MG/ML
60 INJECTION INTRAMUSCULAR; INTRAVENOUS ONCE
Status: COMPLETED | OUTPATIENT
Start: 2018-09-05 | End: 2018-09-05

## 2018-09-05 RX ORDER — MORPHINE SULFATE 10 MG/ML
10 INJECTION, SOLUTION INTRAMUSCULAR; INTRAVENOUS
Status: DISCONTINUED | OUTPATIENT
Start: 2018-09-05 | End: 2018-09-06

## 2018-09-05 RX ORDER — ONDANSETRON 2 MG/ML
8 INJECTION INTRAMUSCULAR; INTRAVENOUS EVERY 8 HOURS PRN
Status: DISCONTINUED | OUTPATIENT
Start: 2018-09-05 | End: 2018-09-06 | Stop reason: HOSPADM

## 2018-09-05 RX ORDER — MORPHINE SULFATE 10 MG/ML
5 INJECTION, SOLUTION INTRAMUSCULAR; INTRAVENOUS
Status: DISCONTINUED | OUTPATIENT
Start: 2018-09-05 | End: 2018-09-06

## 2018-09-05 RX ORDER — ATROPINE SULFATE 10 MG/ML
2 SOLUTION/ DROPS OPHTHALMIC EVERY 4 HOURS PRN
Status: DISCONTINUED | OUTPATIENT
Start: 2018-09-05 | End: 2018-09-06 | Stop reason: HOSPADM

## 2018-09-05 RX ADMIN — Medication 25 MCG: at 16:28

## 2018-09-05 RX ADMIN — AMPICILLIN SODIUM AND SULBACTAM SODIUM 3 G: 2; 1 INJECTION, POWDER, FOR SOLUTION INTRAMUSCULAR; INTRAVENOUS at 07:00

## 2018-09-05 RX ADMIN — FENTANYL CITRATE 25 MCG: 50 INJECTION INTRAMUSCULAR; INTRAVENOUS at 11:14

## 2018-09-05 RX ADMIN — DOXYCYCLINE 100 MG: 100 INJECTION, POWDER, LYOPHILIZED, FOR SOLUTION INTRAVENOUS at 00:12

## 2018-09-05 RX ADMIN — ONDANSETRON 4 MG: 2 INJECTION INTRAMUSCULAR; INTRAVENOUS at 05:13

## 2018-09-05 RX ADMIN — ONDANSETRON 8 MG: 2 INJECTION INTRAMUSCULAR; INTRAVENOUS at 23:55

## 2018-09-05 RX ADMIN — SODIUM CHLORIDE 500 MG: 9 INJECTION, SOLUTION INTRAVENOUS at 07:13

## 2018-09-05 RX ADMIN — DEXMEDETOMIDINE HYDROCHLORIDE 0.2 MCG/KG/HR: 100 INJECTION, SOLUTION INTRAVENOUS at 04:24

## 2018-09-05 RX ADMIN — LORAZEPAM 2 MG: 2 INJECTION INTRAMUSCULAR; INTRAVENOUS at 11:38

## 2018-09-05 RX ADMIN — PAPAVERINE HYDROCHLORIDE 60 MG: 30 INJECTION, SOLUTION INTRAVENOUS at 04:23

## 2018-09-05 RX ADMIN — AMPICILLIN SODIUM AND SULBACTAM SODIUM 3 G: 2; 1 INJECTION, POWDER, FOR SOLUTION INTRAMUSCULAR; INTRAVENOUS at 00:12

## 2018-09-05 RX ADMIN — FENTANYL CITRATE 25 MCG: 50 INJECTION INTRAMUSCULAR; INTRAVENOUS at 01:46

## 2018-09-05 RX ADMIN — SODIUM CHLORIDE 500 MG: 9 INJECTION, SOLUTION INTRAVENOUS at 17:23

## 2018-09-05 RX ADMIN — FENTANYL CITRATE 25 MCG: 50 INJECTION INTRAMUSCULAR; INTRAVENOUS at 03:39

## 2018-09-05 RX ADMIN — SODIUM CHLORIDE: 9 INJECTION, SOLUTION INTRAVENOUS at 08:39

## 2018-09-05 ASSESSMENT — PAIN SCALES - GENERAL: PAINLEVEL_OUTOF10: 3

## 2018-09-05 NOTE — PROGRESS NOTES
Spoke with MD Delgado concerning patient's status and plan of care. Family states they want to keep patient comfortable and not continue any further test. MD Delgado to speak with family concerning this issue. MD Delgado notified of patient's stroke score that was completed and stated he wanted q1h neuro checks discontinued. MD Delgado notified of patients heart rate dropping to the 40-50s & patient's elevated WBC of 17.7 & Potassium of 3.3.

## 2018-09-05 NOTE — CARE PLAN
Problem: Pain Management  Goal: Pain level will decrease to patient's comfort goal    Intervention: Follow pain managment plan developed in collaboration with patient and Interdisciplinary Team  Spoke with MD Delgado and palliative about patient's pain management. New pain management orders were put in place. Patient and family educated on pain management and verbalized understanding. Will continue to monitor, assess & treat patient's pain.

## 2018-09-05 NOTE — PALLIATIVE CARE
"Palliative Care follow-up  Discussed POLST form with Danielle and Virgil, discussed conversation with Angelica. Danielle is in agreement to sign POLST form and discharge pt to her home with home health and volunteer hospice. Completed POLST form.     Received a msg to call Leslie ([phone]), returned call. Leslie states she has concerns about Danielle's ability to care for pt at her home. Leslie feels that Danielle doesn't understand and cannot care for pt at home. Leslie feels that Virgil has a history of mental health issues and is concerned about the medication at home. Leslie feels that Danielle and Virgil are too young to be caring for someone at end of life. PC RN educated Leslie on hospice care at home, answered questions. Danielle states \"I guess we will just have to hope they do the right thing\". Leslie asked about sending pt to a nursing facility on hospice, PC RN explained that Medicare will cover the cost of hospice but not room and board and therefore there will be out of pocket cost. Leslie state that the family doesn't have financial resources to cover that, and again said \"Well we are just going to have to hope they do the right thing\".     PC RN discussed that hospice care at home was discussed in detail with DanielleVirgil heath and Danielle'faye davis and they all verbalized understanding and agreement. Leslie states she will do her best to visit if she can to ensure proper care is given to pt. Leslie is appreciative of the information and will call if she has any other questions or concerns.     Updated:   Dr. Agueda Figueroa    Plan:   POLST complete, scanned into EMR and original placed on hard chart to be sent home with the pt. Discharge to Danielle's house once home health and volunteer hospice is arranged.     Thank you for allowing Palliative Care to participate in this patient's care. Please feel free to call [phone] with any questions or concerns.  "

## 2018-09-05 NOTE — PROGRESS NOTES
Pulmonary Critical Care Progress Note      DOA: 9/4/2018    Chief Complaint: AMS    History of Present Illness: 68 y F with significant PMH of MS treated with THC and melanoma that was fine last night but family grandchildren didn't hear from her and so called today and she explained she was just sick, could express denied nausea or vomiting, or diarrhea, fever but was just sick. Family went over to find her AMS so was taken to local ER from to have ICH from metastic lesion and transferred to [facility] for higher level of care. Patient mallika aspirin of blood thinners, but does take ibuprofen regular doesn't take vicodin she is prescribed. Mallika hypertension, cva, dm or cardiac disease no tobacco or drug use other then THC.         Interval Events:  24 hour interval history reviewed   Tm 98.8  -1L over last 24hr  MRI brain 9/4 reviewed  Wbc 17  Na 140  K 3.3    Precedex 0.5  NS @ 75  Unasyn  Doxy  Decadron     Addendum:  Pt now slightly more awake. I discussed case with pt family and decision made to transition to comfort measures/hospice.     Review of Systems   Unable to perform ROS: Acuity of condition     Physical Exam   Constitutional:   Frail appearing   HENT:   Head: Normocephalic.   Nose: Nose normal.   Eyes: Pupils are equal, round, and reactive to light. Conjunctivae are normal.   Neck: No tracheal deviation present.   Cardiovascular: Normal rate and intact distal pulses.    Pulmonary/Chest: She has no wheezes. She has no rales.   Abdominal: Soft. She exhibits no distension. There is no tenderness.   Musculoskeletal: She exhibits no edema.   Neurological: No cranial nerve deficit.   Skin: Skin is warm and dry. She is not diaphoretic.   Psychiatric:   lethargic   Nursing note and vitals reviewed.      PFSH:  No change.    Respiratory:     Pulse Oximetry: 94 %  Chest Tube Drains:                  Invalid input(s): [id number]    HemoDynamics:  Pulse: (!) 59, Heart Rate (Monitored): (!) 56  Blood Pressure :  118/80, NIBP: 119/55         Recent Labs      18   1317   TROPONINI  <0.01   BNPBTYPENAT  140*       Neuro:  GCS Total [place] Coma Score: 14           Fluids:  Intake/Output       18 - 18 (Not Admitted) 18 07 - 18 0659 18 - 18 0659      5807-1020 6317-4975 Total 1900-0659 Total 0602-5792 3131-7690 Total       Intake    I.V.  --  -- --  100  -- 100  --  -- --    IV Piggyback Volume (IV Piggyback) -- -- -- 100 -- 100 -- -- --    Total Intake -- -- -- 100 -- 100 -- -- --       Output    Urine  --  -- --  100  1050 1150  --  -- --    Number of Times Voided -- -- -- 1 x 1 x 2 x -- -- --    Urine Void (mL) (non-catheter) -- -- -- 100 225 325 -- -- --    Output (mL) (Urinary Catheter Ureteral Catheter) -- -- -- -- 825 825 -- -- --    Total Output -- -- -- 100 1050 1150 -- -- --       Net I/O     -- -- -- 0 -1050 -1050 -- -- --        Weight: 45.5 kg (100 lb 5 oz)  Recent Labs      18   0502   SODIUM  138  140   POTASSIUM  3.6  3.3*   CHLORIDE  106  111   CO2  21  17*   BUN  17  19   CREATININE  0.64  0.63   CALCIUM  9.5  9.5       GI/Nutrition:    Liver Function  Recent Labs      18   0502   ALTSGPT  17   --    ASTSGOT  21   --    ALKPHOSPHAT  91   --    TBILIRUBIN  0.5   --    LIPASE  <3*   --    GLUCOSE  166*  165*       Heme:  Recent Labs      18   0502   RBC  4.14*  3.43*   HEMOGLOBIN  12.6  10.4*   HEMATOCRIT  38.1  32.4*   PLATELETCT  308  238   PROTHROMBTM  13.1   --    APTT  28.0   --    INR  1.02   --        Infectious Disease:  Temp  Av.8 °C (98.3 °F)  Min: 36.3 °C (97.3 °F)  Max: 37.1 °C (98.8 °F)  Micro: cultures reviewed  Recent Labs      18   1317  18   0502   WBC  16.7*  17.7*   NEUTSPOLYS  93.00*  91.80*   LYMPHOCYTES  3.20*  4.10*   MONOCYTES  3.10  3.30   EOSINOPHILS  0.10  0.00   BASOPHILS  0.20  0.20   ASTSGOT  21   --    ALTSGPT  17   --     ALKPHOSPHAT  91   --    TBILIRUBIN  0.5   --      Current Facility-Administered Medications   Medication Dose Frequency Provider Last Rate Last Dose   • dexmedetomidine (PRECEDEX) 400 mcg in D5W 100 mL infusion  0.1-1.5 mcg/kg/hr Continuous Spencer Santos M.D. 2.3 mL/hr at 09/05/18 0424 0.2 mcg/kg/hr at 09/05/18 0424   • fentaNYL (SUBLIMAZE) injection 25-50 mcg  25-50 mcg Q HOUR PRN Spencer Santos M.D.       • levETIRAcetam (KEPPRA) 500 mg in  mL IVPB  500 mg Q12HRS Brandon Bentley M.D. 400 mL/hr at 09/05/18 0713 500 mg at 09/05/18 0713   • labetalol (NORMODYNE,TRANDATE) injection 10 mg  10 mg Q HOUR PRN Brandon Bentley M.D.       • hydrALAZINE (APRESOLINE) injection 10 mg  10 mg Q HOUR PRN Brandon Bentley M.D.       • senna-docusate (PERICOLACE or SENOKOT S) 8.6-50 MG per tablet 2 Tab  2 Tab BID Sebas Prince M.D.   Stopped at 09/04/18 1759    And   • polyethylene glycol/lytes (MIRALAX) PACKET 1 Packet  1 Packet QDAY PRN Sebas Prince M.D.        And   • magnesium hydroxide (MILK OF MAGNESIA) suspension 30 mL  30 mL QDAY PRN Sebas Prince M.D.        And   • bisacodyl (DULCOLAX) suppository 10 mg  10 mg QDAY PRN Sebas Prince M.D.       • ondansetron (ZOFRAN) syringe/vial injection 4 mg  4 mg Q4HRS PRN Sebas Prince M.D.   4 mg at 09/05/18 0513   • ondansetron (ZOFRAN ODT) dispertab 4 mg  4 mg Q4HRS PRN Sebas Prince M.D.       • acetaminophen (TYLENOL) tablet 650 mg  650 mg Q6HRS PRN Sebas Prince M.D.       • NS infusion   Continuous Spenser Smith M.D. 75 mL/hr at 09/04/18 1815     • ampicillin/sulbactam (UNASYN) 3 g in  mL IVPB  3 g Q6HRS Sebas Prince M.D. 200 mL/hr at 09/05/18 0700 3 g at 09/05/18 0700   • doxycycline (VIBRAMYCIN) 100 mg in  mL IVPB  100 mg Q12HRS Sebas Prince M.D.   Stopped at 09/05/18 0112     This patient's medications have not been reviewed.    Quality  Measures:  Labs reviewed, Medications reviewed and Radiology images  reviewed                      Assessment/Plan:  ICH/IVH metastic lesions:    - q1 hour neuro exams   - continue keppra   - aspiration/sz precautions   - steroids   - no antiplatelet/ac therapies   - stat repeat imaging with any decline     Encephalopathy/Agitation   - related to above   - active titration of precedex     MS:    - unclear history   - Use THC for control     THC use     History of melanoma:   - suspect etiology to brain mets    Leukocytosis   - on empiric abx    Hypokalemia   - I am replacing to keep > 4      Discussed patient condition and risk of morbidity and/or mortality with RN, RT, Therapies, Pharmacy and hospitalist.    The patient remains critically ill.  Critical care time = 31 minutes in directly providing and coordinating critical care and extensive data review.  No time overlap and excludes procedures.

## 2018-09-05 NOTE — DIETARY
Nutrition Services:  Brief Update    RD following pt for diet advancement, NPO x 2.    Per chart review, pt has transitioned to comfort care.    RD will re-screen pt weekly.  Consult RD as needed.    RD available PRN.

## 2018-09-05 NOTE — H&P
"Hospital Medicine History & Physical Note    Date of Service  2018    Primary Care Physician  No primary care provider on file.    Consultants  Critical Care  Neurosurgery    Code Status  Full Code    Chief Complaint  Found confused    History of Presenting Illness  68 y.o. female who presented 2018 after being found confused.    Ms Espinosa has a history of what sounds like a dysplastic nevus syndrome, she has had several suspicious looking cutaneus lesions removed in past yesars.  After my conversation with the patient's grandchildren I believe that none of these have been confirmed melanoma.  Patient's last lesion was removed about a week ago, they do not know if the pathology has been finalized.  The patient is independent and lives by herself, she has been in her normal state of health until this morning.  She spoke with the patient's granddaughter by phone and said \"I am sick, I am sick.\"  I patient's neighbor went to check on her and found her quite altered, she was taken to [facility] emergency room where CT scan imaging was concerning for intracranial hemorrhage and metastatic disease.  Patient was transferred to [facility] for higher level of care and neurosurgery coverage.  MRI of the brain here has revealed multiple metastasis to the brain.      The patient is unable to provide interval history, she moans at times and says that she is sick, her visual expressions was suggested that she has some understanding of the questions asked but is unable to answer.  She appears to have right greater than left weakness.  She is unable to say if she is in pain.    Review of Systems  Review of Systems   Unable to perform ROS: Mental status change       Past Medical History  Melanoma  Multiple Sclerosis    Family History  Both parents  traumatically at a young age, medical history unknown  Patient's siblings and children are healthy as far as the family knows    Social History  No " alcohol or tobacco, she uses marijuana for MS    Allergies  Allergies   Allergen Reactions   • Codeine        Medications  None       Physical Exam  Blood Pressure : 118/80   Temperature: 36.8 °C (98.2 °F)   Pulse: 79   Respiration: (!) 21   Pulse Oximetry: 97 %     Physical Exam   Constitutional: She appears well-developed.   Thin elderly woman   HENT:   Head: Normocephalic and atraumatic.   Eyes: Conjunctivae and EOM are normal. Right eye exhibits no discharge. Left eye exhibits no discharge.   Cardiovascular: Normal rate, regular rhythm and intact distal pulses.    No murmur heard.  2+ Radial Pulses  Brisk Capillary Refill   Pulmonary/Chest: Effort normal and breath sounds normal. No respiratory distress. She has no wheezes.   Abdominal: Soft. Bowel sounds are normal. She exhibits no distension. There is no tenderness. There is no rebound.   Musculoskeletal: Normal range of motion. She exhibits no edema.   Neurological: No cranial nerve deficit.   Weak on the right  Confused  Receptive and expressive aphasia   Skin: Skin is warm and dry. She is not diaphoretic. No erythema.   A few atypical appearing dark colored lesions       Laboratory:  Recent Labs      09/04/18   1317   WBC  16.7*   RBC  4.14*   HEMOGLOBIN  12.6   HEMATOCRIT  38.1   MCV  92.0   MCH  30.4   MCHC  33.1*   RDW  42.4   PLATELETCT  308   MPV  11.4     Recent Labs      09/04/18   1317   SODIUM  138   POTASSIUM  3.6   CHLORIDE  106   CO2  21   GLUCOSE  166*   BUN  17   CREATININE  0.64   CALCIUM  9.5     Recent Labs      09/04/18   1317   ALTSGPT  17   ASTSGOT  21   ALKPHOSPHAT  91   TBILIRUBIN  0.5   LIPASE  <3*   GLUCOSE  166*     Recent Labs      09/04/18   1317   APTT  28.0   INR  1.02     Recent Labs      09/04/18   1317   BNPBTYPENAT  140*         Lab Results   Component Value Date    TROPONINI <0.01 09/04/2018       Urinalysis:    No results found     Imaging:  MR-BRAIN-WITH & W/O   Final Result      1.  Hemorrhagic brain metastasis in the  left posterior temporal and occipital lobe. The hemorrhagic component measures about 75 mm in maximum dimension. An oblong solid enhancing portion of the medial aspect of the hemorrhage is noted. Associated    left-to-right shift of midline structures.   2.  Intraventricular hemorrhage with mild hydrocephalus.   3.  Numerous additional supratentorial hemorrhagic and nonhemorrhagic brain metastases numbering at least 14 lesions. The largest of these seen at the right parietal vertex measures 19 mm. Additional nodules are subcentimeter in size.   4.  4 mm brainstem metastasis in the right paramedian markell and 3 nodular cerebellar metastatic deposits are noted.      OUTSIDE IMAGES-CT HEAD   Final Result      OUTSIDE IMAGES-DX CHEST   Final Result            Assessment/Plan:  I anticipate this patient will require at least two midnights for appropriate medical management, necessitating inpatient admission.    * Metastasis to brain (HCC)- (present on admission)   Assessment & Plan    Multiple metastasis with hemorrhage  Concerning for metastatic melanoma vs metastatic lung  [facility]surgery [facility]  Palliative care consultation  Grand daughter believes the patient would want to go home with hospice, this will likely be difficult given remote location  Patient also has a living daughter who I believe would be next of kin, will enlist social work help to find the daugther        Nontraumatic intracerebral hemorrhage (HCC)- (present on admission)   Assessment & Plan    Due to metastatic lesion  Blood pressure control        Pneumonia- (present on admission)   Assessment & Plan    Leukocytosis, possible post obstructive process  CT chest  Unasyn and doxy for now        Encephalopathy- (present on admission)   Assessment & Plan    Secondary to brain metastasis and bleed, possibly post ictal as well  Supportive care        Dysplastic nevus- (present on admission)   Assessment & Plan    Multiple dysplastic nevi  Some have  been removed, family indicates no confirmed diagnosis of melanoma        Lung mass- (present on admission)   Assessment & Plan    CT chest            VTE prophylaxis: SCD's

## 2018-09-05 NOTE — PROGRESS NOTES
MD on call contacted about patients severe pain and urine retention. Orders were given for medications per the MAR and a fall if unsuccessful. Bladder scan showed 780ml of urine in the bladder. After one hour, the fall was inserted. The patients pain dramatically improved. Will continue to monitor.

## 2018-09-05 NOTE — DISCHARGE PLANNING
Agency/Facility Name: [facility]  Spoke To: Glenda  Outcome: Patient accepted and they are going to contact the family for set up.

## 2018-09-05 NOTE — PROGRESS NOTES
Spoke with granddaughter    I offered to take out the left occip hemorrhage, but it would not change the underlying severe metastatic disease.  I recommended NOT escalating her level of care.    Discussed with critical care.

## 2018-09-05 NOTE — PROGRESS NOTES
[facility] Hospitalist Progress Note    Date of Service: 2018    Chief Complaint  68 y.o. female admitted 2018 with ICH     Interval Problem Update  Severe expressive aphasia   On Room air  Grand daughter at bedside  K 3.3      Consultants/Specialty  Neurosurgery  Critical care  Palliative care    Disposition  Home with hospice        Review of Systems   Unable to perform ROS: Mental status change      Physical Exam  Laboratory/Imaging   Hemodynamics  Temp (24hrs), Av.8 °C (98.3 °F), Min:36.3 °C (97.3 °F), Max:37.1 °C (98.8 °F)   Temperature: 36.9 °C (98.4 °F)  Pulse  Av.4  Min: 47  Max: 88 Heart Rate (Monitored): (!) 56  Blood Pressure : 118/80, NIBP: 119/55      Respiratory      Respiration: 17, Pulse Oximetry: 94 %        RUL Breath Sounds: Clear, RML Breath Sounds: Clear, RLL Breath Sounds: Clear, MAR Breath Sounds: Clear, LLL Breath Sounds: Clear    Fluids    Intake/Output Summary (Last 24 hours) at 18 0729  Last data filed at 18 0424   Gross per 24 hour   Intake              100 ml   Output             1150 ml   Net            -1050 ml       Nutrition  Orders Placed This Encounter   Procedures   • Diet NPO     Standing Status:   Standing     Number of Occurrences:   1     Order Specific Question:   Restrict to:     Answer:   Strict [1]     Physical Exam   Constitutional: She appears well-developed and well-nourished.   HENT:   Head: Normocephalic and atraumatic.   Mouth/Throat: No oropharyngeal exudate.   Eyes: Conjunctivae are normal. Right eye exhibits no discharge. Left eye exhibits no discharge. No scleral icterus.   Neck: Neck supple. No JVD present. No tracheal deviation present.   Cardiovascular: Normal rate and regular rhythm.  Exam reveals no gallop and no friction rub.    No murmur heard.  Pulmonary/Chest: Effort normal and breath sounds normal. No stridor. No respiratory distress. She has no rales. She exhibits no tenderness.   Abdominal: Soft. Bowel sounds are normal. She  exhibits no distension. There is no tenderness. There is no rebound.   Musculoskeletal: She exhibits no edema or tenderness.   Neurological: No cranial nerve deficit. She exhibits abnormal muscle tone (Rt hemiparesis 4/5).   lethargic   Skin: Skin is warm and dry. She is not diaphoretic. No cyanosis. Nails show no clubbing.   Psychiatric: Her speech is delayed. She is slowed. Cognition and memory are impaired.   Nursing note and vitals reviewed.      Recent Labs      09/04/18   1317  09/05/18   0502   WBC  16.7*  17.7*   RBC  4.14*  3.43*   HEMOGLOBIN  12.6  10.4*   HEMATOCRIT  38.1  32.4*   MCV  92.0  94.5   MCH  30.4  30.3   MCHC  33.1*  32.1*   RDW  42.4  44.9   PLATELETCT  308  238   MPV  11.4  11.2     Recent Labs      09/04/18   1317  09/05/18   0502   SODIUM  138  140   POTASSIUM  3.6  3.3*   CHLORIDE  106  111   CO2  21  17*   GLUCOSE  166*  165*   BUN  17  19   CREATININE  0.64  0.63   CALCIUM  9.5  9.5     Recent Labs      09/04/18   1317   APTT  28.0   INR  1.02     Recent Labs      09/04/18   1317   BNPBTYPENAT  140*              Assessment/Plan     * Metastasis to brain (HCC)- (present on admission)   Assessment & Plan    Multiple metastasis with hemorrhage  Concerning for metastatic melanoma vs metastatic lung  Neurosurgery recommends [facility]  Palliative care consultation  Grand daughter believes the patient would want to go home with hospice, this will likely be difficult given remote location  Patient also has a living daughter who I believe would be next of kin, will enlist social work help to find the daugther        Nontraumatic intracerebral hemorrhage (HCC)- (present on admission)   Assessment & Plan    Due to metastatic lesion  Blood pressure control        Pneumonia- (present on admission)   Assessment & Plan    Leukocytosis, possible post obstructive process  CT chest  Unasyn and doxy for now        Encephalopathy- (present on admission)   Assessment & Plan    Secondary to brain metastasis and  bleed, possibly post ictal as well  Supportive care        Dysplastic nevus- (present on admission)   Assessment & Plan    Multiple dysplastic nevi  Some have been removed, family indicates no confirmed diagnosis of melanoma        Lung mass- (present on admission)   Assessment & Plan    CT chest          Quality-Core Measures   Reviewed items::  Labs reviewed, Medications reviewed and Radiology images reviewed  Young catheter::  Critically Ill - Requiring Accurate Measurement of Urinary Output  DVT prophylaxis pharmacological::  Contraindicated - High bleeding risk      Addendum: After discussion with family they are all in agreement to transition to comfort care and try to arrange for discharge home with hospice, discussed with palliative care and with critical care

## 2018-09-05 NOTE — DISCHARGE PLANNING
Anticipated Discharge Disposition: Home on Hospice    Action: Orders    Informed by CCS, Danita that there were no orders for the pt's HH. Requested order from attending physician, Dr. Agueda Figueroa.     Barriers to Discharge: Acceptance from [facility] and [facility].    Plan: Transfer pt home via [facility] once the pt has acceptance and all needed items in place. Pt can not travel in a seated position so the the will need to be transported home via ambulance.

## 2018-09-05 NOTE — ASSESSMENT & PLAN NOTE
Continue Keppra and close clinical monitoring  Evaluated by neurosurgery  Patient and family leaning towards comfort care

## 2018-09-05 NOTE — DISCHARGE PLANNING
Agency/Facility Name: [facility]  Spoke To: Jcarlos  Outcome: Patient Accepted.

## 2018-09-05 NOTE — DISCHARGE PLANNING
Agency/Facility Name: [facility]  Spoke To: Socorro  Outcome: Attempted to get status, however, there was no answer, left message.

## 2018-09-05 NOTE — DISCHARGE PLANNING
Received Transport Form @ 5113  Spoke to Victor Hugo MORALES    Transport is scheduled for 9/6/18 @0900 going to [facility] .

## 2018-09-05 NOTE — DISCHARGE PLANNING
Anticipated Discharge Disposition: Home Hospice    Action: Discharge Supports    Informed by Palliative RNRosanne that the pt has been transitioned to Hospice. Hospice supports will be carried out by [facility]. Rosanne reports they have already been contacted. Pt will need DME (medical bed) and [facility].     TC to pt daughter and EPHRAIM Dominguez [phone]. She provided this LSW with verbal consent for, HH and DME. Faxed and left message for Danita ZAMUDIO     Barriers to Discharge: Acceptance from [facility] and DME being delivered to the pt's address, [address] [place] [address].    Plan: Set up transport though [facility] once there are no more barriers.

## 2018-09-05 NOTE — DISCHARGE PLANNING
Agency/Facility Name: [facility]   Correspondence  Outcome: Patient Accepted. EDUARDA Law notified.

## 2018-09-05 NOTE — DISCHARGE PLANNING
Received Choice form at 3238  Agency/Facility Name: [facility]  Referral sent per Choice form @ 5501

## 2018-09-05 NOTE — CARE PLAN
Problem: Neuro Status  Goal: Monitor neuro status and rapid identification of neuro changes  Outcome: PROGRESSING AS EXPECTED  Neuro checks per MD order. Notifying MD of any neuro status changes.    Problem: Pain  Goal: Alleviation of Pain or a reduction in pain to the patient's comfort goal  Outcome: PROGRESSING AS EXPECTED  Pain assessed q2h. Pt positioned with pillows for comfort. Pain medications given PRN as needed per MAR. Distraction techniques in place - television on.

## 2018-09-05 NOTE — DISCHARGE PLANNING
Agency/Facility Name: [facility]  Spoke To: Jcarlos  Outcome: Asked to fax over POLST, Done

## 2018-09-05 NOTE — DISCHARGE PLANNING
Medical Social Work    LSW received page that pt's granddaughter Danielle Quinones is requesting a lodging letter and list.      LSW wrote letter and tubed the letter and list to tube station 12 in [facility].

## 2018-09-05 NOTE — CONSULTS
"Reason for PC Consult: Advance Care Planning    Consulted by:   Dr. Prince    Assessment:  General:   68 year old female admitted for brain mass on 9/4/18. Pt has a history of melanoma and MS. Pt was found down in her home by a neighbor friend, last known normal was the evening of 9/3/18. Pt taken to [facility], CT scanned showed ICH with multiple metastatic lesions. Pt then transferred to [facility] ED for further evaluation.     Dyspnea: None, 97% on RA  Last BM:  (PTA)  Pain: Unable to determine   Depression: Unable to determine   Dementia: Unable to Determine    Spiritual:  Is Scientology or spirituality important for coping with this illness? No, Family declined visit from   Has a  or spiritual provider visit been requested? No    Palliative Performance Scale: 10%    Advance Directive: None on File  DPOA: None on File, EPHRAIM Quinones ([phone])   POLST: None on File    Code Status: Full     Outcome:  PC RN visited pt and granddaughter Danielle at bedside, introduced self and role of PC. Pt is confused and unable to participate in conversation. Pt's granddaughter Danielle states pt raise her and her brother Virgil since childhood, states her mother left \"every's life and wrote us off\". PC RN explained that legal NOK is pt's daughter, Danielle states pt's sister has been making decisions, again PC RN explained that [place] Law is clear on who legal NOK is. PC RN explained that she will call pt's daughter and she has the choice to revoke decision making if she so chooses, however she still needs to be included in decision making.     Danielle became tearful and expressed that pt would want to not suffer and be back at home, Danielle states she would want pt to return to her home in [place]. PC RN explained end of life care at home in [place] with [facility] and [facility], Danielle states that she knows pt would want that.     PC RN called Angelica, introduced self and role of PC. Angelica " states that she is pt's only child and she has two children, Danielle and Virgil. Discussed Angelica's understanding of clinical picture, Angelica verbalized understanding of the metastasis in pt's brain and that it's time to focus on pt's comfort. PC RN explained comfort care in detail, answered questions. PC RN explained [facility], answered questions. Angelica verbalized agreement with Comfort care now and discharge to [facility] with hospice level of care. Angelica states she will not be coming to visit pt at bedside, that she will wait until pt is at [facility] to visit. PC RN asked Angelica if she would be OK with Danielle signing healthcare documents, like a POLST form. Discussed POLST form, Angelica stated that she is OK with DNR and comfort focused treatment with no feeding tubes. Angelica also states that she is OK with Danielle signing healthcare documents but wants to retain her (Angelica) ability to make medical decisions.     Obtained hospice choice for [facility], faxed to [facility].    Active listening, validation, empathetic support, education, and emotional support provided.      Updated:   Dr. Delgado, Thanh CIFUENTES, Bedside RN    Plan:   Comfort Care now, Discharge to [facility] with [facility].     Recommendations: I recommend a hospice consult.    Thank you for allowing Palliative Care to participate in this patient's care. Please feel free to call [phone] with any questions or concerns.

## 2018-09-05 NOTE — DISCHARGE PLANNING
Anticipated Discharge Disposition: Home Hospice    Action: IMM    Pt unable to sign IMM. Completed as, N/A    Barriers to Discharge: Acceptance from Critical access hospital    Plan: DC vis [facility] 9/6/2018 0900.

## 2018-09-05 NOTE — CARE PLAN
Problem: Discharge Barriers/Planning  Goal: Patient's continuum of care needs will be met    Intervention: Assess potential discharge barriers on admission and throughout hospital stay  Spoke to palliative about making arrangements for patient to be able to go home. Palliative is working to collaborate with the appropriate parties to allow that to happen. Patient's family was educated on the process and verbalized understating.

## 2018-09-05 NOTE — DISCHARGE PLANNING
Received Choice form at 1018  Agency/Facility Name: [facility]  Referral sent per Choice form @ 8958

## 2018-09-05 NOTE — DISCHARGE PLANNING
Agency/Facility Name: [facility]  Spoke To: Jcarlos  Outcome: She is going to look over the referral and will decide if she is going to accept, she stated that she will get back to this [facility] as soon as she makes a decision. She also stated that she needs to get a physical address sent to her. EDUARDA Law notified.

## 2018-09-05 NOTE — FACE TO FACE
Face to Face Note  -  Durable Medical Equipment    Jhonny Figueroa M.D. - NPI: [id number]  I certify that this patient is under my care and that they have had a durable medical equipment(DME)face to face encounter by myself that meets the physician DME face-to-face encounter requirements with this patient on:    Date of encounter:   Patient:                    MRN:                       YOB: 2019  Yoana Espinosa  [id number]  1950     The encounter with the patient was in whole, or in part, for the following medical condition, which is the primary reason for durable medical equipment:  Other - metastatic melanoma with ICH    I certify that, based on my findings, the following durable medical equipment is medically necessary:  Beds.    HOME O2 Saturation Measurements:(Values must be present for Home Oxygen orders)         ,     ,         My Clinical findings support the need for the above equipment due to:  Abnormal Gait, Mental Status    Supporting Symptoms: altered mental status     ------------------------------------------------------------------------------------------------------------------    Face to Face Supporting Documentation - Home Health    The encounter with this patient was in whole or in part the primary reason for home health admission.    Date of encounter:   Patient:                    MRN:                       YOB: 2019  Yoana Espinosa  [id number]  1950     Home health to see patient for:  Skilled Nursing care for assessment, interventions & education, Medical social work consult and Home health aide    Skilled need for:  Exacerbation of Chronic Disease State ICH with metastatic melanoma    Skilled nursing interventions to include:  Comment: medication adjustment and monitoring    Homebound evidenced status by:  Need the aid of supportive devices such as crutches, canes, wheelchairs or walkers. Leaving home must require a considerable and  taxing effort. There must exist a normal inability to leave the home.    Community Physician to provide follow up care: No primary care provider on file.     Optional Interventions    Wound information & treatment:    Home Infusion Therapy orders:    Line/Drain/Airway:    I certify the face to face encounter for this home care referral meets the CMS requirements and the encounter/clinical assessment with the patient was, in whole, or in part, for the medical condition(s) listed above, which is the primary reason for home health care. Based on my clinical findings: the service(s) are medically necessary, support the need for home health care, and the homebound criteria are met.  I certify that this patient has had a face to face encounter by myself.  Jhonny Figueroa M.D. - NPI: [id number]    *Debility, frailty and advanced age in the absence of an acute deterioration or exacerbation of a condition do not qualify a patient for home health.

## 2018-09-05 NOTE — DISCHARGE PLANNING
Anticipated Discharge Disposition: Home Hospice    Action: Transport    Completed [facility] and Transport Communication form. Faxed and notified Danita ZAMUDIO. Requested transport via [facility] for tomorrow at 0900    Barriers to Discharge: Comformation of transport    Plan: Assist as needed.

## 2018-09-06 VITALS
WEIGHT: 100.31 LBS | RESPIRATION RATE: 15 BRPM | TEMPERATURE: 98.2 F | OXYGEN SATURATION: 95 % | DIASTOLIC BLOOD PRESSURE: 80 MMHG | HEIGHT: 66 IN | SYSTOLIC BLOOD PRESSURE: 118 MMHG | BODY MASS INDEX: 16.12 KG/M2 | HEART RATE: 73 BPM

## 2018-09-06 PROCEDURE — 700105 HCHG RX REV CODE 258: Performed by: NEUROLOGICAL SURGERY

## 2018-09-06 PROCEDURE — A9270 NON-COVERED ITEM OR SERVICE: HCPCS | Performed by: HOSPITALIST

## 2018-09-06 PROCEDURE — 700102 HCHG RX REV CODE 250 W/ 637 OVERRIDE(OP): Performed by: HOSPITALIST

## 2018-09-06 PROCEDURE — 700111 HCHG RX REV CODE 636 W/ 250 OVERRIDE (IP): Performed by: NEUROLOGICAL SURGERY

## 2018-09-06 PROCEDURE — 99231 SBSQ HOSP IP/OBS SF/LOW 25: CPT | Performed by: INTERNAL MEDICINE

## 2018-09-06 PROCEDURE — 99239 HOSP IP/OBS DSCHRG MGMT >30: CPT | Performed by: HOSPITALIST

## 2018-09-06 RX ORDER — FENTANYL 25 UG/1
1 PATCH TRANSDERMAL
Status: DISCONTINUED | OUTPATIENT
Start: 2018-09-06 | End: 2018-09-06 | Stop reason: HOSPADM

## 2018-09-06 RX ORDER — FENTANYL 25 UG/1
1 PATCH TRANSDERMAL
Qty: 2 PATCH | Refills: 0 | Status: SHIPPED | OUTPATIENT
Start: 2018-09-06 | End: 2018-09-12

## 2018-09-06 RX ORDER — LEVETIRACETAM 100 MG/ML
500 SOLUTION ORAL 2 TIMES DAILY
Qty: 100 ML | Refills: 0 | Status: SHIPPED | OUTPATIENT
Start: 2018-09-06 | End: 2018-09-16

## 2018-09-06 RX ORDER — OXYCODONE HCL 5 MG/5 ML
5 SOLUTION, ORAL ORAL EVERY 4 HOURS PRN
Status: DISCONTINUED | OUTPATIENT
Start: 2018-09-06 | End: 2018-09-06 | Stop reason: HOSPADM

## 2018-09-06 RX ORDER — OXYCODONE HCL 20 MG/ML
5 CONCENTRATE, ORAL ORAL EVERY 4 HOURS PRN
Status: DISCONTINUED | OUTPATIENT
Start: 2018-09-06 | End: 2018-09-06

## 2018-09-06 RX ORDER — LORAZEPAM 2 MG/ML
1 CONCENTRATE ORAL EVERY 4 HOURS PRN
Qty: 9 ML | Refills: 0 | Status: SHIPPED | OUTPATIENT
Start: 2018-09-06 | End: 2018-09-09

## 2018-09-06 RX ORDER — ONDANSETRON 8 MG/1
8 TABLET, ORALLY DISINTEGRATING ORAL EVERY 8 HOURS PRN
Qty: 10 TAB | Refills: 0 | Status: SHIPPED | OUTPATIENT
Start: 2018-09-06

## 2018-09-06 RX ADMIN — SODIUM CHLORIDE 500 MG: 9 INJECTION, SOLUTION INTRAVENOUS at 05:40

## 2018-09-06 RX ADMIN — FENTANYL 1 PATCH: 25 PATCH, EXTENDED RELEASE TRANSDERMAL at 08:30

## 2018-09-06 ASSESSMENT — PAIN SCALES - GENERAL: PAINLEVEL_OUTOF10: 0

## 2018-09-06 NOTE — CARE PLAN
Problem: Pain Management  Goal: Pain level will decrease to patient's comfort goal    Intervention: Follow pain managment plan developed in collaboration with patient and Interdisciplinary Team  Patients pain was not adequately covered. MD was contacted and orders were given to increase pain medication. Shortly after implementing, the patients pain was under control.      Problem: Knowledge Deficit  Goal: Knowledge of disease process/condition, treatment plan, diagnostic tests, and medications will improve  Outcome: PROGRESSING AS EXPECTED  Family and patient was informed about the disease process/condition. The plan was clearly discussed and everyone understands.

## 2018-09-06 NOTE — PROGRESS NOTES
Patient discharged to home hospice care via [facility], transporting to [address] [place] [address]. Patient DC'd at about 0905 this morning via [facility]. All discharge paperwork sent with [facility]. Patient DC'd with all valuables and belongings.

## 2018-09-06 NOTE — DISCHARGE INSTRUCTIONS
Discharge Instructions    Discharged to home by medical transportation with escort. Discharged via ambulance, hospital escort: Yes.  Special equipment needed: Not Applicable    Be sure to schedule a follow-up appointment with your primary care doctor or any specialists as instructed.     Discharge Plan:        I understand that a diet low in cholesterol, fat, and sodium is recommended for good health. Unless I have been given specific instructions below for another diet, I accept this instruction as my diet prescription.   Other diet: General    Special Instructions: None    · Is patient discharged on Warfarin / Coumadin?   No     Depression / Suicide Risk    As you are discharged from this [facility] facility, it is important to learn how to keep safe from harming yourself.    Recognize the warning signs:  · Abrupt changes in personality, positive or negative- including increase in energy   · Giving away possessions  · Change in eating patterns- significant weight changes-  positive or negative  · Change in sleeping patterns- unable to sleep or sleeping all the time   · Unwillingness or inability to communicate  · Depression  · Unusual sadness, discouragement and loneliness  · Talk of wanting to die  · Neglect of personal appearance   · Rebelliousness- reckless behavior  · Withdrawal from people/activities they love  · Confusion- inability to concentrate       Hospice  Hospice is a service that is designed to provide people who are terminally ill and their families with medical, spiritual, and psychological support. Its aim is to improve your quality of life by keeping you as alert and comfortable as possible. Hospice is performed by a team of health care professionals and volunteers who:  · Help keep you comfortable. Hospice can be provided in your home or in a homelike setting. The hospice staff works with your family and friends to help meet your needs. You will enjoy the support of loved ones by receiving  much of your basic care from family and friends.  · Provide pain relief and manage your symptoms. The staff supply all necessary medicines and equipment.  · Provide companionship when you are alone.  · Allow you and your family to rest. They may do light housekeeping, prepare meals, and run errands.  · Provide counseling. They will make sure your emotional, spiritual, and social needs and those of your family are being met.  · Provide spiritual care. Spiritual care is individualized to meet your needs and your family's needs. It may involve helping you look at what death means to you, say goodbye, or perform a specific Episcopal ceremony or ritual.  Hospice teams often include:  · A nurse.  · A doctor.  · Social workers.  · Moravian leaders (such as a ).  · Trained volunteers.  WHEN SHOULD HOSPICE CARE BEGIN?  Most people who use hospice are believed to have fewer than 6 months to live. Your family and health care providers can help you decide when hospice services should begin. If your condition improves, you may discontinue the program.  WHAT SHOULD I CONSIDER BEFORE SELECTING A PROGRAM?  Most hospice programs are run by nonprofit, independent organizations. Some are affiliated with hospitals, nursing homes, or home health care agencies. Hospice programs can take place in the home or at a hospice center, hospital, or skilled nursing facility. When choosing a hospice program, ask the following questions:  · What services are available to me?  · What services are offered to my loved ones?  · How involved are my loved ones?  · How involved is my health care provider?  · Who makes up the hospice care team? How are they trained or screened?  · How will my pain and symptoms be managed?  · If my circumstances change, can the services be provided in a different setting, such as my home or in the hospital?  · Is the program reviewed and licensed by the state or certified in some other way?  WHERE CAN I LEARN MORE  ABOUT HOSPICE?  You can learn about existing hospice programs in your area from your health care providers. You can also read more about hospice online. The websites of the following organizations contain helpful information:  · The National Hospice and Palliative Care Organization (NHPCO).  · The Hospice Association of [place] (HAA).  · The Hospice Education [place].  · The American Cancer Society (ACS).  · Hospice Net.  This information is not intended to replace advice given to you by your health care provider. Make sure you discuss any questions you have with your health care provider.  Document Released: 04/05/2005 Document Revised: 12/23/2014 Document Reviewed: 10/28/2014  [employer] Interactive Patient Education © 2017 [employer]    If you or a loved one observes any of these behaviors or has concerns about self-harm, here's what you can do:  · Talk about it- your feelings and reasons for harming yourself  · Remove any means that you might use to hurt yourself (examples: pills, rope, extension cords, firearm)  · Get professional help from the community (Mental Health, Substance Abuse, psychological counseling)  · Do not be alone:Call your Safe Contact- someone whom you trust who will be there for you.  · Call your local CRISIS HOTLINE 401-5024 or 272-592-9491  · Call your local Children's Mobile Crisis Response Team [place] (392) 809-0809 or www.[employer]  · Call the toll free National Suicide Prevention Hotlines   · National Suicide Prevention Lifeline 083-193-RKLM (2264)  · National Hope Line Network 800-SUICIDE (556-0464)

## 2018-09-06 NOTE — DISCHARGE PLANNING
Anticipated Discharge Disposition: Home Hospice    Action: Discharge/Transfer    Completed Transfer Packet and COBRA form. Received Verbal from pt's daughter Angelica.     Notified pt's granddaughter, Noreen that the pt is going to be transported via [facility] at 0900 today. Provided Noreen with the contact information for [facility]. Placed POLST in Transfer Packet.    Barriers to Discharge: None    Plan: Pt to dc today at 0900.

## 2018-09-06 NOTE — PROGRESS NOTES
Pulmonary Critical Care Progress Note      DOA: 9/4/2018    Chief Complaint: AMS    History of Present Illness: 68 y F with significant PMH of MS treated with THC and melanoma that was fine last night but family grandchildren didn't hear from her and so called today and she explained she was just sick, could express denied nausea or vomiting, or diarrhea, fever but was just sick. Family went over to find her AMS so was taken to local ER from to have ICH from metastic lesion and transferred to [facility] for higher level of care. Patient mallika aspirin of blood thinners, but does take ibuprofen regular doesn't take vicodin she is prescribed. Mallika hypertension, cva, dm or cardiac disease no tobacco or drug use other then THC.         Interval Events:  24 hour interval history reviewed   Tm 98.3  -786cc over last 24hr      Fentanyl @ 25mcg  keppra  Pt transitioned to comfort/hospice with plan to go home to [place] today on hospice      Review of Systems   Unable to perform ROS: Acuity of condition     Physical Exam   Constitutional:   Frail appearing   HENT:   Head: Normocephalic.   Nose: Nose normal.   Eyes: Pupils are equal, round, and reactive to light. Conjunctivae are normal.   Neck: No tracheal deviation present.   Cardiovascular: Intact distal pulses.    Jesus rate   Pulmonary/Chest: She has no wheezes. She has no rales.   Abdominal: Soft. She exhibits no distension. There is no tenderness.   Musculoskeletal: She exhibits no edema.   Neurological: No cranial nerve deficit.   Skin: Skin is warm and dry. She is not diaphoretic.   Psychiatric:   lethargic   Nursing note and vitals reviewed.  unchanged    PFSH:  No change.    Respiratory:     Pulse Oximetry: 93 %  Chest Tube Drains:                  Invalid input(s): [id number]    HemoDynamics:  Pulse: (!) 48, Heart Rate (Monitored): (!) 45  NIBP: 106/62         Recent Labs      09/04/18   1317   TROPONINI  <0.01   BNPBTYPENAT  140*       Neuro:  GCS Total  Toya Coma Score: 8           Fluids:  Intake/Output       18 07 - 18 0659 18 07 - 18 0659 18 - 18 0659      0696-1624 5823-2719 Total 9121-5751 6932-2961 Total 1900-0659 Total       Intake    I.V.  100  -- 100  88.7  -- 88.7  --  -- --    Precedex Volume -- -- -- 86.7 -- 86.7 -- -- --    IV Piggyback Volume (IV Piggyback) 100 -- 100 -- -- -- -- -- --    IV Volume (Fentanyl) -- -- -- 2 -- 2 -- -- --    Total Intake 100 -- 100 88.7 -- 88.7 -- -- --       Output    Urine  100  1050 1150  470  405 875  --  -- --    Number of Times Voided 1 x 1 x 2 x -- -- -- -- -- --    Urine Void (mL) (non-catheter) 100 225 325 -- -- -- -- -- --    Output (mL) (Urinary Catheter Ureteral Catheter) -- 825 825 470 405 875 -- -- --    Total Output 100 1050 1150 470 405 875 -- -- --       Net I/O     0 -1050 -1050 -381.3 -405 -786.3 -- -- --           Recent Labs      18   0502   SODIUM  138  140   POTASSIUM  3.6  3.3*   CHLORIDE  106  111   CO2  21  17*   BUN  17  19   CREATININE  0.64  0.63   CALCIUM  9.5  9.5       GI/Nutrition:    Liver Function  Recent Labs      18   0502   ALTSGPT  17   --    ASTSGOT  21   --    ALKPHOSPHAT  91   --    TBILIRUBIN  0.5   --    LIPASE  <3*   --    GLUCOSE  166*  165*       Heme:  Recent Labs      18   0502   RBC  4.14*  3.43*   HEMOGLOBIN  12.6  10.4*   HEMATOCRIT  38.1  32.4*   PLATELETCT  308  238   PROTHROMBTM  13.1   --    APTT  28.0   --    INR  1.02   --        Infectious Disease:  Temp  Av.8 °C (98.3 °F)  Min: 36.8 °C (98.3 °F)  Max: 36.8 °C (98.3 °F)  Micro: cultures reviewed  Recent Labs      18   1317  18   0502   WBC  16.7*  17.7*   NEUTSPOLYS  93.00*  91.80*   LYMPHOCYTES  3.20*  4.10*   MONOCYTES  3.10  3.30   EOSINOPHILS  0.10  0.00   BASOPHILS  0.20  0.20   ASTSGOT  21   --    ALTSGPT  17   --    ALKPHOSPHAT  91   --    TBILIRUBIN  0.5   --       Current Facility-Administered Medications   Medication Dose Frequency Provider Last Rate Last Dose   • dexmedetomidine (PRECEDEX) 400 mcg in D5W 100 mL infusion  0.1-1.5 mcg/kg/hr Continuous Spencer Santos M.D.   Stopped at 09/05/18 1730   • fentaNYL (SUBLIMAZE) injection 25-50 mcg  25-50 mcg Q HOUR PRN Spencer Santos M.D.   25 mcg at 09/05/18 1114   • MD ALERT...adult comfort care   PRN Spenser Delgado M.D.       • atropine 1 % ophthalmic solution 2 Drop  2 Drop Q4HRS PRN Spenser Delgado M.D.       • morphine (ROXANOL) 20 MG/ML oral conc 10 mg  10 mg Q HOUR PRN Spenser Delgado M.D.        Or   • morphine (pf) 10 mg/ml 10 MG/ML injection 5 mg  5 mg Q15 MIN PRTAMARA Delgado M.D.        Or   • morphine (pf) 10 mg/ml 10 MG/ML injection 10 mg  10 mg Q15 MIN PRTAMARA Delgado M.D.       • ondansetron (ZOFRAN ODT) dispertab 8 mg  8 mg Q8HRS PRN Spenser Delgado M.D.        Or   • ondansetron (ZOFRAN) syringe/vial injection 8 mg  8 mg Q8HRS PRTAMARA Delgado M.D.   8 mg at 09/05/18 2355   • LORazepam (ATIVAN) 2 MG/ML oral conc 1 mg  1 mg Q HOUR PRN Spenser Delgado M.D.        Or   • LORazepam (ATIVAN) injection 1-5 mg  1-5 mg Q HOUR PRN Spenser Delgado M.D.   2 mg at 09/05/18 1138   • docusate sodium (COLACE) capsule 100 mg  100 mg Q12HRS Spenser Delgado M.D.   Stopped at 09/05/18 1800   • fentaNYL (SUBLIMAZE) 50 mcg/mL in 50mL (Continuous Infusion)   Continuous Jhonny Figueroa M.D. 0.5 mL/hr at 09/05/18 1628 25 mcg at 09/05/18 1628   • levETIRAcetam (KEPPRA) 500 mg in  mL IVPB  500 mg Q12HRS Brandon Bentley M.D. 400 mL/hr at 09/06/18 0540 500 mg at 09/06/18 0540   • senna-docusate (PERICOLACE or SENOKOT S) 8.6-50 MG per tablet 2 Tab  2 Tab BID Sebas Prince M.D.   Stopped at 09/04/18 5365    And   • polyethylene glycol/lytes (MIRALAX) PACKET 1 Packet  1 Packet QDAY PRN Sebas Prince M.D.        And   • magnesium hydroxide (MILK OF MAGNESIA) suspension 30 mL  30 mL QDAY PRTAMARA Mullen  RADHA Prince M.D.        And   • bisacodyl (DULCOLAX) suppository 10 mg  10 mg QDAY PRN Sebas Prince M.D.       • acetaminophen (TYLENOL) tablet 650 mg  650 mg Q6HRS PRN Sebas Prince M.D.         This patient's medications have not been reviewed.    Quality  Measures:   Labs reviewed, Medications reviewed and Radiology images reviewed                      Assessment/Plan:  ICH/IVH metastic lesions:    - continue keppra   - aspiration/sz precautions   - no antiplatelet/ac therapies     Encephalopathy/Agitation   - related to above   - off precedex   - goal of hospice/comfort now    MS:    - unclear history   - Use THC for control     THC use     History of melanoma:   - suspect etiology to brain mets    Leukocytosis   - off abx    Pt appears comfortable, is being sent home on hospice/comfort measures today. Keep fall in for urinary retention.       Discussed patient condition and risk of morbidity and/or mortality with RN, RT, Therapies, Pharmacy and hospitalist.

## 2018-09-06 NOTE — DISCHARGE SUMMARY
DATE OF ADMISSION:  09/04/2018    DATE OF DISCHARGE:  09/06/2018    PRINCIPAL DIAGNOSES:  1.  Intracerebral hemorrhage with multiple metastatic brain masses.  2.  History of multiple sclerosis.  3.  History of suspected melanoma.  4.  Leukocytosis.  5.  Pneumonia.  6.  Acute encephalopathy.  7.  Lung mass.    HOSPITAL COURSE:  The patient is a 68-year-old female who was transferred to   our facility for evaluation of intracranial hemorrhage after she was noted to   be altered.  She had an MRI of the brain, which revealed hemorrhagic brain   metastases in the left posterior temporal and occipital lobe as well as   intraventricular hemorrhage and numerous hemorrhagic or nonhemorrhagic brain   metastases.  She was evaluated by neurosurgery.  She was admitted to the   intensive care unit.  After discussion with the patient and her family they   elected to transition to comfort care measures given her extremely guarded   prognosis.  The patient expressed wishes to be discharged home with hospice   and hospice referral and arrangements had been made and the patient will be   discharged home with hospice today.  She will be discharged with the following   instructions.    DIET:  As tolerated.    ACTIVITY:  As tolerated.    DISCHARGE MEDICATIONS:  Fentanyl 25 mcg patch every 72 hours, Keppra 500 mg   b.i.d., lorazepam 1 mg every 4 hours as needed, Zofran ODT 8 mg every 8 hours   as needed.    The patient will be following with her hospice team and primary care provider.    Discharge plan was discussed with the patient and also discussed with nursing   staff,  and Dr. Owens from critical care.    Total time spent preparing for this discharge today was 38 minutes.       ____________________________________     MD JAVY PÉREZ / AKBAR    DD:  09/06/2018 12:27:10  DT:  09/06/2018 12:56:28    D#:  [id number]  Job#:  [id number]

## 2018-09-07 NOTE — DISCHARGE PLANNING
Agency/Facility Name: [facility]  Spoke To: Bethanie  Outcome: Needed to have the DC summary faxed over. Done.

## 2018-09-10 LAB
BACTERIA BLD CULT: NORMAL
BACTERIA BLD CULT: NORMAL
SIGNIFICANT IND 70042: NORMAL
SIGNIFICANT IND 70042: NORMAL
SITE SITE: NORMAL
SITE SITE: NORMAL
SOURCE SOURCE: NORMAL
SOURCE SOURCE: NORMAL